# Patient Record
Sex: FEMALE | Race: WHITE | NOT HISPANIC OR LATINO | Employment: FULL TIME | URBAN - METROPOLITAN AREA
[De-identification: names, ages, dates, MRNs, and addresses within clinical notes are randomized per-mention and may not be internally consistent; named-entity substitution may affect disease eponyms.]

---

## 2017-10-16 ENCOUNTER — TRANSCRIBE ORDERS (OUTPATIENT)
Dept: ADMINISTRATIVE | Facility: HOSPITAL | Age: 52
End: 2017-10-16

## 2017-10-16 DIAGNOSIS — Z12.31 VISIT FOR SCREENING MAMMOGRAM: Primary | ICD-10-CM

## 2017-11-29 ENCOUNTER — GENERIC CONVERSION - ENCOUNTER (OUTPATIENT)
Dept: OTHER | Facility: OTHER | Age: 52
End: 2017-11-29

## 2017-11-29 ENCOUNTER — HOSPITAL ENCOUNTER (OUTPATIENT)
Dept: RADIOLOGY | Facility: HOSPITAL | Age: 52
Discharge: HOME | End: 2017-11-29
Payer: COMMERCIAL

## 2017-11-29 DIAGNOSIS — Z12.31 VISIT FOR SCREENING MAMMOGRAM: ICD-10-CM

## 2017-11-29 PROCEDURE — G0202 SCR MAMMO BI INCL CAD: HCPCS

## 2017-12-06 ENCOUNTER — HOSPITAL ENCOUNTER (OUTPATIENT)
Dept: ULTRASOUND IMAGING | Facility: CLINIC | Age: 52
Discharge: HOME | End: 2017-12-06
Payer: COMMERCIAL

## 2017-12-06 ENCOUNTER — GENERIC CONVERSION - ENCOUNTER (OUTPATIENT)
Dept: OTHER | Facility: OTHER | Age: 52
End: 2017-12-06

## 2017-12-06 DIAGNOSIS — R92.8 ABNORMAL MAMMOGRAM: ICD-10-CM

## 2017-12-06 PROCEDURE — 76642 ULTRASOUND BREAST LIMITED: CPT

## 2019-03-07 RX ORDER — PROPRANOLOL HYDROCHLORIDE 20 MG/1
TABLET ORAL
Qty: 60 TABLET | Refills: 1 | OUTPATIENT
Start: 2019-03-07

## 2019-04-22 DIAGNOSIS — R51.9 NONINTRACTABLE HEADACHE, UNSPECIFIED CHRONICITY PATTERN, UNSPECIFIED HEADACHE TYPE: Primary | ICD-10-CM

## 2019-04-22 RX ORDER — ZOLMITRIPTAN 5 MG/1
TABLET, FILM COATED ORAL
Qty: 30 TABLET | Refills: 0 | Status: SHIPPED | OUTPATIENT
Start: 2019-04-22 | End: 2020-04-14

## 2021-06-10 ENCOUNTER — OFFICE VISIT (OUTPATIENT)
Dept: OBGYN CLINIC | Facility: CLINIC | Age: 56
End: 2021-06-10
Payer: COMMERCIAL

## 2021-06-10 VITALS
SYSTOLIC BLOOD PRESSURE: 126 MMHG | HEIGHT: 60 IN | DIASTOLIC BLOOD PRESSURE: 82 MMHG | WEIGHT: 176.8 LBS | BODY MASS INDEX: 34.71 KG/M2

## 2021-06-10 DIAGNOSIS — Z12.31 VISIT FOR SCREENING MAMMOGRAM: ICD-10-CM

## 2021-06-10 DIAGNOSIS — Z72.3 INADEQUATE EXERCISE: ICD-10-CM

## 2021-06-10 DIAGNOSIS — E58 DIETARY CALCIUM DEFICIENCY: ICD-10-CM

## 2021-06-10 DIAGNOSIS — D21.9 FIBROIDS: ICD-10-CM

## 2021-06-10 DIAGNOSIS — Z01.419 ENCOUNTER FOR ANNUAL ROUTINE GYNECOLOGICAL EXAMINATION: Primary | ICD-10-CM

## 2021-06-10 PROBLEM — K64.8 OTHER HEMORRHOIDS: Status: ACTIVE | Noted: 2021-06-10

## 2021-06-10 PROCEDURE — S0610 ANNUAL GYNECOLOGICAL EXAMINA: HCPCS | Performed by: OBSTETRICS & GYNECOLOGY

## 2021-06-10 NOTE — PROGRESS NOTES
Pt is a60 y o   ,menopausal, who presents for preventive care  Partner same ; lifetime  1 partner         safe sexual practices followed: not currently active       does feel safe in relationship: yes    Dairy: 1 cheese daily  Vitamin D: takes supplement  Exercise: 2x/week  Pap: 3/13/2019-wnl, HRHPV neg, repeat next year  Mammogram: 2019-3D wnl, repeat rx given  Colonoscopy: 2021-wnl, repeat 10 years [facility])  DEXA: not indicated  safety at home: yes  tobacco use N  Pt reports h o large fibroids-denies any problems, but interested to know if they are still growing       Past Medical History:   Diagnosis Date    COVID-19 vaccine series completed     [place], completed 2nd dose in 2021    Fibroid uterus     Hyperlipemia     resolved    Varicella     Vitamin D deficiency        Past Surgical History:   Procedure Laterality Date    COLONOSCOPY      2021-wnl, repeat 10 years    WISDOM TOOTH EXTRACTION         Ob Hx:   OB History    Para Term  AB Living   2 2 2     2   SAB TAB Ectopic Multiple Live Births                  # Outcome Date GA Lbr Philip/2nd Weight Sex Delivery Anes PTL Lv   2 Term      Vag-Spont      1 Term      Vag-Spont         Obstetric Comments    x 2      Menarche: 12   Menopause: 47             Current Outpatient Medications:     Dextran 70-Hypromellose, PF, (Artificial Tears PF) 0 1-0 3 % SOLN, Apply 2 drops to eye 4 (four) times a day, Disp: 1 each, Rfl: 11    ergocalciferol (VITAMIN D2) 50,000 units, Take 1 capsule (50,000 Units total) by mouth once a week, Disp: 12 capsule, Rfl: 3    levothyroxine 75 mcg tablet, TAKE 1 TABLET (75 MCG TOTAL) BY MOUTH DAILY IN THE EARLY MORNING, Disp: 90 tablet, Rfl: 3    ZOLMitriptan (ZOMIG) 5 MG tablet, Take 1 tablet (5 mg total) by mouth once as needed for migraine for up to 1 dose, Disp: 8 tablet, Rfl: 6    No Known Allergies    Social History     Socioeconomic History    Marital status: /Civil Union     Spouse name: Juan Caal    Number of children: 2    Years of education: None    Highest education level: High school graduate   Occupational History    None   Social Needs    Financial resource strain: None    Food insecurity     Worry: None     Inability: None    Transportation needs     Medical: None     Non-medical: None   Tobacco Use    Smoking status: Never Smoker    Smokeless tobacco: Never Used   Substance and Sexual Activity    Alcohol use: Never     Frequency: Never    Drug use: Never    Sexual activity: Not Currently     Partners: Male     Birth control/protection: Post-menopausal     Comment: lifetime partners: 1   Lifestyle    Physical activity     Days per week: None     Minutes per session: None    Stress: None   Relationships    Social connections     Talks on phone: None     Gets together: None     Attends Zoroastrianism service: None     Active member of club or organization: None     Attends meetings of clubs or organizations: None     Relationship status: None    Intimate partner violence     Fear of current or ex partner: None     Emotionally abused: None     Physically abused: None     Forced sexual activity: None   Other Topics Concern    None   Social History Narrative    P[employer]ism: [employer]    [employer] blood products            Exercise: walking 2x/week    Calcium: 1 cheese daily       Family History   Problem Relation Age of Onset    Heart disease Mother     Asthma Mother     Diabetes type II Mother     Hypertension Mother     Other Father         smoker,  when patient was young, unsure why    Other Sister         hysterectomy    Heart disease Sister     Kidney cancer Brother 68        on top of kidney    Asthma Sister     Hypertension Sister     No Known Problems Brother     No Known Problems Brother     Diabetes Brother     Hypertension Brother     Heart disease Brother         required CABG    Diabetes Brother     Hypertension Brother     Hyperlipidemia Brother     Hypertension Brother     Diabetes Brother     Heart disease Brother         required stent    Other Brother         Twin of Radha    Breast cancer Neg Hx     Ovarian cancer Neg Hx     Colon cancer Neg Hx        Blood pressure 126/82, height 5' (1 524 m), weight 80 2 kg (176 lb 12 8 oz)  and Body mass index is 34 53 kg/m²  Physical Exam  Constitutional:       General: She is not in acute distress  Appearance: Normal appearance  She is well-developed  She is obese  She is not ill-appearing  HENT:      Head: Normocephalic and atraumatic  Eyes:      Extraocular Movements: Extraocular movements intact  Conjunctiva/sclera: Conjunctivae normal    Neck:      Musculoskeletal: Normal range of motion and neck supple  Thyroid: No thyromegaly  Trachea: No tracheal deviation  Cardiovascular:      Rate and Rhythm: Normal rate and regular rhythm  Heart sounds: Normal heart sounds  Pulmonary:      Effort: Pulmonary effort is normal  No respiratory distress  Breath sounds: Normal breath sounds  No stridor  No wheezing or rales  Abdominal:      General: Bowel sounds are normal  There is no distension  Palpations: Abdomen is soft  There is no mass  Tenderness: There is no abdominal tenderness  There is no guarding or rebound  Hernia: No hernia is present  Musculoskeletal: Normal range of motion  General: No tenderness  Lymphadenopathy:      Cervical: No cervical adenopathy  Skin:     General: Skin is warm  Findings: No erythema or rash  Neurological:      Mental Status: She is alert and oriented to person, place, and time  Psychiatric:         Mood and Affect: Mood normal          Behavior: Behavior normal          Thought Content:  Thought content normal          Judgment: Judgment normal           Breasts: breasts appear normal, no suspicious masses, no skin or nipple changes or axillary nodes, symmetric fibrous changes in both upper outer quadrants  vulva: normal external genitalia for age and no lesions, masses, epithelial changes, or exudate  vagina: color pale and rugae  absent rugae  cervix: parous and no lesions   uterus: NSSC, AF, NT, mobile and 14 wks  adnexa: no masses or tenderness    A/P:  Pt is a 63 y o   with       Davene Older was seen today for gynecologic exam     Diagnoses and all orders for this visit:    Encounter for annual routine gynecological examination  -pap up to date  -colonoscopy up to date    Visit for screening mammogram  -     Mammo diagnostic right w 3d & cad; Future    Fibroids  -     US pelvis complete w transvaginal; Future    Dietary calcium deficiency  Patient advised recommendation of daily dietary calcium of 1200 mg calcium  Inadequate exercise  Patient advised recommendation of exercise 5 times per week for 30 minutes  BMI 34 0-34 9,adult  Patient advised recommendation of BMI to be between 19-25

## 2021-06-14 ENCOUNTER — OFFICE VISIT (OUTPATIENT)
Dept: URGENT CARE | Age: 56
End: 2021-06-14
Payer: COMMERCIAL

## 2021-06-14 VITALS
TEMPERATURE: 98.1 F | WEIGHT: 175 LBS | HEIGHT: 62 IN | DIASTOLIC BLOOD PRESSURE: 89 MMHG | HEART RATE: 80 BPM | SYSTOLIC BLOOD PRESSURE: 136 MMHG | OXYGEN SATURATION: 96 % | BODY MASS INDEX: 32.2 KG/M2 | RESPIRATION RATE: 18 BRPM

## 2021-06-14 DIAGNOSIS — M54.50 ACUTE LEFT-SIDED LOW BACK PAIN WITHOUT SCIATICA: Primary | ICD-10-CM

## 2021-06-14 DIAGNOSIS — M77.11 LATERAL EPICONDYLITIS OF RIGHT ELBOW: ICD-10-CM

## 2021-06-14 PROCEDURE — 99213 OFFICE O/P EST LOW 20 MIN: CPT | Performed by: PHYSICIAN ASSISTANT

## 2021-06-14 PROCEDURE — 96372 THER/PROPH/DIAG INJ SC/IM: CPT | Performed by: PHYSICIAN ASSISTANT

## 2021-06-14 RX ORDER — KETOROLAC TROMETHAMINE 30 MG/ML
30 INJECTION, SOLUTION INTRAMUSCULAR; INTRAVENOUS ONCE
Status: COMPLETED | OUTPATIENT
Start: 2021-06-14 | End: 2021-06-14

## 2021-06-14 RX ADMIN — KETOROLAC TROMETHAMINE 30 MG: 30 INJECTION, SOLUTION INTRAMUSCULAR; INTRAVENOUS at 17:24

## 2021-06-14 NOTE — PATIENT INSTRUCTIONS
Acute Low Back Pain     Continue Motrin 800 mg every 8 hours as needed, hold until tomorrow morning as you received a Toradol shot in clinic today  Supplement with Tylenol as needed  Take muscle relaxer as prescribed, do not drive or operate machinery until you know how this medication affects you  Heat 20 minutes followed immediately by ice 20 minutes  May try over the counter lidocaine patches as needed, do not apply heat over patch  If symptoms are not improved in 3-4 days, follow-up with your primary care provider  If symptoms worsen, report to the emergency department  AMBULATORY CARE:   Acute low back pain  is sudden discomfort in your lower back area that lasts for up to 6 weeks  The discomfort makes it difficult to tolerate activity  Common symptoms include the following:   · Back stiffness or spasms    · Pain down the back or side of one leg    · Holding yourself in an unusual position or posture to decrease your back pain    · Not being able to find a sitting position that is comfortable    · Slow increase in your pain for 24 to 48 hours after you stress your back    · Tenderness on your lower back or severe pain when you move your back    Seek care immediately if:   · You have severe pain  · You have sudden stiffness and heaviness on both buttocks down to both legs  · You have numbness or weakness in one leg, or pain in both legs  · You have numbness in your genital area or across your lower back  · You cannot control your urine or bowel movements  Contact your healthcare provider if:   · You have a fever  · You have pain at night or when you rest     · Your pain does not get better with treatment  · You have pain that worsens when you cough or sneeze  · You suddenly feel something pop or snap in your back  · You have questions or concerns about your condition or care      The goal of treatment for acute low back pain  is to relieve your pain and help you tolerate activity  Most people with acute lower back pain get better within 4 to 6 weeks  You may need any of the following:  · NSAIDs  help decrease swelling and pain  This medicine is available with or without a doctor's order  NSAIDs can cause stomach bleeding or kidney problems in certain people  If you take blood thinner medicine, always ask your healthcare provider if NSAIDs are safe for you  Always read the medicine label and follow directions  · Acetaminophen  decreases pain and fever  It is available without a doctor's order  Ask how much to take and how often to take it  Follow directions  Read the labels of all other medicines you are using to see if they also contain acetaminophen, or ask your doctor or pharmacist  Acetaminophen can cause liver damage if not taken correctly  Do not use more than 4 grams (4,000 milligrams) total of acetaminophen in one day  · Muscle relaxers  decrease pain by relaxing the muscles in your lower spine  · Prescription pain medicine  may be given  Ask your healthcare provider how to take this medicine safely  Some prescription pain medicines contain acetaminophen  Do not take other medicines that contain acetaminophen without talking to your healthcare provider  Too much acetaminophen may cause liver damage  Prescription pain medicine may cause constipation  Ask your healthcare provider how to prevent or treat constipation  Manage your symptoms:   · Stay active  as much as you can without causing more pain  Bed rest could make your back pain worse  Start with some light exercises such as walking  Avoid heavy lifting until your pain is gone  Ask for more information about the activities or exercises that are right for you  · Apply ice  on your back for 15 to 20 minutes every hour or as directed  Use an ice pack, or put crushed ice in a plastic bag  Cover it with a towel before you apply it to your skin  Ice helps prevent tissue damage and decreases swelling and pain  · Apply heat  on your back for 20 to 30 minutes every 2 hours for as many days as directed  Heat helps decrease pain and muscle spasms  Alternate heat and ice  Prevent acute low back pain:   · Use proper body mechanics  ? Bend at the hips and knees when you  objects  Do not bend from the waist  Use your leg muscles as you lift the load  Do not use your back  Keep the object close to your chest as you lift it  Try not to twist or lift anything above your waist     ? Change your position often when you stand for long periods of time  Rest one foot on a small box or footrest, and then switch to the other foot often  ? Try not to sit for long periods of time  When you do, sit in a straight-backed chair with your feet flat on the floor  Never reach, pull, or push while you are sitting  · Do exercises that strengthen your back muscles  Warm up before you exercise  Ask your healthcare provider the best exercises for you  · Maintain a healthy weight  Ask your healthcare provider how much you should weigh  Ask him to help you create a weight loss plan if you are overweight  Follow up with your healthcare provider as directed:  Return for a follow-up visit if you still have pain after 1 to 3 weeks of treatment  You may need to visit an orthopedist if your back pain lasts longer than 12 weeks  Write down your questions so you remember to ask them during your visits  © Copyright [address] Information is for End User's use only and may not be sold, redistributed or otherwise used for commercial purposes  All illustrations and images included in CareNotes® are the copyrighted property of A D A M , Inc  or [facility] Ronal Johnson   The above information is an  only  It is not intended as medical advice for individual conditions or treatments  Talk to your doctor, nurse or pharmacist before following any medical regimen to see if it is safe and effective for you      Tennis Elbow Continue Motrin as needed  Supplement with Tylenol as needed  Ice 20 minutes on 20 minutes off as needed  Follow-up with with orthopedics for further treatment and evaluation, referral provided  If symptoms worsen, report to the emergency department  AMBULATORY CARE:   Tennis elbow  is inflammation of the tendons in your elbow  Tendons are strong tissues that connect muscle to bone  Common symptoms include the following:   · Pain on the side of your elbow that travels to your upper arm, forearm, or fingers    · Weakness in your wrist or hand    · Trouble holding, lifting, or grabbing an object, such as a coffee cup    · Red, swollen, warm skin on the outside of your elbow    Seek care immediately if:   · You suddenly have no feeling in your arm, hand, or fingers  · You suddenly cannot move your arm, wrist, hand, or fingers  Contact your healthcare provider if:   · Your symptoms do not get better within 2 weeks, even with treatment  · You have more pain or weakness in your arm, wrist, hand, or fingers  · You have new numbness or tingling in your arm, hand, or fingers  · You have questions or concerns about your condition or care  Treatment:   · Support devices  may be needed to limit your arm movement  Examples include an arm strap, brace, or splint  These devices also help decrease pain and prevent more damage to your tendon  · Acetaminophen  decreases pain and fever  It is available without a doctor's order  Ask how much to take and how often to take it  Follow directions  Read the labels of all other medicines you are using to see if they also contain acetaminophen, or ask your doctor or pharmacist  Acetaminophen can cause liver damage if not taken correctly  Do not use more than 4 grams (4,000 milligrams) total of acetaminophen in one day  · NSAIDs , such as ibuprofen, help decrease swelling, pain, and fever  This medicine is available with or without a doctor's order  NSAIDs can cause stomach bleeding or kidney problems in certain people  If you take blood thinner medicine, always ask your healthcare provider if NSAIDs are safe for you  Always read the medicine label and follow directions  · A steroid injection  will help decrease pain and swelling  · Physical therapy  may be recommended  A physical therapist teaches you exercises to help improve movement and strength, and to decrease pain  · Surgery  may be needed if your symptoms do not improve with other treatments  During surgery, your healthcare provider will remove any damaged tissue  He may also cut your tendon and reattach it  Self-care:   · Rest  your injured arm and avoid activities that cause pain  This will help your tendons heal     · Apply ice  on your elbow for 15 to 20 minutes every hour or as directed  Use an ice pack, or put crushed ice in a plastic bag  Cover it with a towel before you apply it to your skin  Ice helps prevent tissue damage and decreases swelling and pain  · Elevate  your elbow above the level of your heart as often as you can  This will help decrease swelling and pain  Prop your elbow on pillows or blankets to keep it elevated comfortably  Follow up with your healthcare provider as directed:  Write down your questions so you remember to ask them during your visits  © Copyright [address] [place] [address] Drive Information is for End User's use only and may not be sold, redistributed or otherwise used for commercial purposes  All illustrations and images included in CareNotes® are the copyrighted property of [employer]  or Stephie Plascencia  The above information is an  only  It is not intended as medical advice for individual conditions or treatments  Talk to your doctor, nurse or pharmacist before following any medical regimen to see if it is safe and effective for you

## 2021-06-14 NOTE — PROGRESS NOTES
[address]        NAME: Frankey Bryant is a 63 y o  female  : 1965    MRN: [id number]  DATE: 2021  TIME: 9:31 PM    Assessment and Plan   Acute left-sided low back pain without sciatica [M54 5]  1  Acute left-sided low back pain without sciatica  ketorolac (TORADOL) injection 30 mg   2  Lateral epicondylitis of right elbow  Ambulatory referral to Orthopedic Surgery   Pt with history and exam consistent with acute lumbar strain  Discussed Toradol shot and rx for Flexeril  Dosing and safety discussed  Recommend heat, ice, and lidocaine patches  Elbow symptoms and exam consistent with lateral epicondylitis  Recommend resume Ibuprofen in the morning  Compression with ACE wrap as needed  Orthopedic referral provided for definitive management  Patient Instructions   Acute Low Back Pain     Continue Motrin 800 mg every 8 hours as needed, hold until tomorrow morning as you received a Toradol shot in clinic today  Supplement with Tylenol as needed  Take muscle relaxer as prescribed, do not drive or operate machinery until you know how this medication affects you  Heat 20 minutes followed immediately by ice 20 minutes  May try over the counter lidocaine patches as needed, do not apply heat over patch  If symptoms are not improved in 3-4 days, follow-up with your primary care provider  If symptoms worsen, report to the emergency department  AMBULATORY CARE:   Acute low back pain  is sudden discomfort in your lower back area that lasts for up to 6 weeks  The discomfort makes it difficult to tolerate activity     Common symptoms include the following:   · Back stiffness or spasms    · Pain down the back or side of one leg    · Holding yourself in an unusual position or posture to decrease your back pain    · Not being able to find a sitting position that is comfortable    · Slow increase in your pain for 24 to 48 hours after you stress your back    · Tenderness on your lower back or severe pain when you move your back    Seek care immediately if:   · You have severe pain  · You have sudden stiffness and heaviness on both buttocks down to both legs  · You have numbness or weakness in one leg, or pain in both legs  · You have numbness in your genital area or across your lower back  · You cannot control your urine or bowel movements  Contact your healthcare provider if:   · You have a fever  · You have pain at night or when you rest     · Your pain does not get better with treatment  · You have pain that worsens when you cough or sneeze  · You suddenly feel something pop or snap in your back  · You have questions or concerns about your condition or care  The goal of treatment for acute low back pain  is to relieve your pain and help you tolerate activity  Most people with acute lower back pain get better within 4 to 6 weeks  You may need any of the following:  · NSAIDs  help decrease swelling and pain  This medicine is available with or without a doctor's order  NSAIDs can cause stomach bleeding or kidney problems in certain people  If you take blood thinner medicine, always ask your healthcare provider if NSAIDs are safe for you  Always read the medicine label and follow directions  · Acetaminophen  decreases pain and fever  It is available without a doctor's order  Ask how much to take and how often to take it  Follow directions  Read the labels of all other medicines you are using to see if they also contain acetaminophen, or ask your doctor or pharmacist  Acetaminophen can cause liver damage if not taken correctly  Do not use more than 4 grams (4,000 milligrams) total of acetaminophen in one day  · Muscle relaxers  decrease pain by relaxing the muscles in your lower spine  · Prescription pain medicine  may be given  Ask your healthcare provider how to take this medicine safely  Some prescription pain medicines contain acetaminophen   Do not take other medicines that contain acetaminophen without talking to your healthcare provider  Too much acetaminophen may cause liver damage  Prescription pain medicine may cause constipation  Ask your healthcare provider how to prevent or treat constipation  Manage your symptoms:   · Stay active  as much as you can without causing more pain  Bed rest could make your back pain worse  Start with some light exercises such as walking  Avoid heavy lifting until your pain is gone  Ask for more information about the activities or exercises that are right for you  · Apply ice  on your back for 15 to 20 minutes every hour or as directed  Use an ice pack, or put crushed ice in a plastic bag  Cover it with a towel before you apply it to your skin  Ice helps prevent tissue damage and decreases swelling and pain  · Apply heat  on your back for 20 to 30 minutes every 2 hours for as many days as directed  Heat helps decrease pain and muscle spasms  Alternate heat and ice  Prevent acute low back pain:   · Use proper body mechanics  ? Bend at the hips and knees when you  objects  Do not bend from the waist  Use your leg muscles as you lift the load  Do not use your back  Keep the object close to your chest as you lift it  Try not to twist or lift anything above your waist     ? Change your position often when you stand for long periods of time  Rest one foot on a small box or footrest, and then switch to the other foot often  ? Try not to sit for long periods of time  When you do, sit in a straight-backed chair with your feet flat on the floor  Never reach, pull, or push while you are sitting  · Do exercises that strengthen your back muscles  Warm up before you exercise  Ask your healthcare provider the best exercises for you  · Maintain a healthy weight  Ask your healthcare provider how much you should weigh  Ask him to help you create a weight loss plan if you are overweight      Follow up with your healthcare provider as directed:  Return for a follow-up visit if you still have pain after 1 to 3 weeks of treatment  You may need to visit an orthopedist if your back pain lasts longer than 12 weeks  Write down your questions so you remember to ask them during your visits  © Copyright [address] Information is for End User's use only and may not be sold, redistributed or otherwise used for commercial purposes  All illustrations and images included in CareNotes® are the copyrighted property of [employer]  or Stephie Johnson   The above information is an  only  It is not intended as medical advice for individual conditions or treatments  Talk to your doctor, nurse or pharmacist before following any medical regimen to see if it is safe and effective for you  Tennis Elbow     Continue Motrin as needed  Supplement with Tylenol as needed  Ice 20 minutes on 20 minutes off as needed  Follow-up with with orthopedics for further treatment and evaluation, referral provided  If symptoms worsen, report to the emergency department  AMBULATORY CARE:   Tennis elbow  is inflammation of the tendons in your elbow  Tendons are strong tissues that connect muscle to bone  Common symptoms include the following:   · Pain on the side of your elbow that travels to your upper arm, forearm, or fingers    · Weakness in your wrist or hand    · Trouble holding, lifting, or grabbing an object, such as a coffee cup    · Red, swollen, warm skin on the outside of your elbow    Seek care immediately if:   · You suddenly have no feeling in your arm, hand, or fingers  · You suddenly cannot move your arm, wrist, hand, or fingers  Contact your healthcare provider if:   · Your symptoms do not get better within 2 weeks, even with treatment  · You have more pain or weakness in your arm, wrist, hand, or fingers  · You have new numbness or tingling in your arm, hand, or fingers      · You have questions or concerns about your condition or care  Treatment:   · Support devices  may be needed to limit your arm movement  Examples include an arm strap, brace, or splint  These devices also help decrease pain and prevent more damage to your tendon  · Acetaminophen  decreases pain and fever  It is available without a doctor's order  Ask how much to take and how often to take it  Follow directions  Read the labels of all other medicines you are using to see if they also contain acetaminophen, or ask your doctor or pharmacist  Acetaminophen can cause liver damage if not taken correctly  Do not use more than 4 grams (4,000 milligrams) total of acetaminophen in one day  · NSAIDs , such as ibuprofen, help decrease swelling, pain, and fever  This medicine is available with or without a doctor's order  NSAIDs can cause stomach bleeding or kidney problems in certain people  If you take blood thinner medicine, always ask your healthcare provider if NSAIDs are safe for you  Always read the medicine label and follow directions  · A steroid injection  will help decrease pain and swelling  · Physical therapy  may be recommended  A physical therapist teaches you exercises to help improve movement and strength, and to decrease pain  · Surgery  may be needed if your symptoms do not improve with other treatments  During surgery, your healthcare provider will remove any damaged tissue  He may also cut your tendon and reattach it  Self-care:   · Rest  your injured arm and avoid activities that cause pain  This will help your tendons heal     · Apply ice  on your elbow for 15 to 20 minutes every hour or as directed  Use an ice pack, or put crushed ice in a plastic bag  Cover it with a towel before you apply it to your skin  Ice helps prevent tissue damage and decreases swelling and pain  · Elevate  your elbow above the level of your heart as often as you can  This will help decrease swelling and pain   Prop your elbow on pillows or blankets to keep it elevated comfortably  Follow up with your healthcare provider as directed:  Write down your questions so you remember to ask them during your visits  © Copyright [address] Information is for End User's use only and may not be sold, redistributed or otherwise used for commercial purposes  All illustrations and images included in CareNotes® are the copyrighted property of [employer]  or Stephie Johnson   The above information is an  only  It is not intended as medical advice for individual conditions or treatments  Talk to your doctor, nurse or pharmacist before following any medical regimen to see if it is safe and effective for you  Follow up with PCP in 3-5 days  Proceed to  ER if symptoms worsen  Chief Complaint     Chief Complaint   Patient presents with    Back Pain     pt reports low back pain after cleaning 5 days ago  Pt taking Ibuprofen without relief  Pain radiates down left leg     Elbow Pain     right elbow pain x 1 month  No known injury         History of Present Illness       63year old female presents with complaints of LBP x 5 days radiating into the left buttock  Pt reports she was cleaning and twisted to reach for something when symptoms started  Pain is worse with ROM of the back and flexion of the left hip  She denies radiation of pain into the BLE, paresthesias or BLE, baldder and bowel changes and saddle anesthesia  Pt has taken 800 mg Motrin every 6 hours with minimal relief  She states she had similar symptoms 4 years ago and had a shot which helped her pain  Pt additionally reports pain in the right elbow for 1 month duration, no inciting injury  She notes pain about the lateral epicondyle, no swelling noted  Pain is worse with overhead reaching  Pt notes some radiation into the forearm, but denies paresthesias of the fingers  No other concerns or complaints today         Review of Systems   Review of Systems   Constitutional: Negative for chills and fever  Musculoskeletal: Positive for arthralgias, back pain and gait problem  Neurological: Negative for weakness and numbness  Current Medications       Current Outpatient Medications:     Dextran 70-Hypromellose, PF, (Artificial Tears PF) 0 1-0 3 % SOLN, Apply 2 drops to eye 4 (four) times a day, Disp: 1 each, Rfl: 11    ergocalciferol (VITAMIN D2) 50,000 units, Take 1 capsule (50,000 Units total) by mouth once a week, Disp: 12 capsule, Rfl: 3    levothyroxine 75 mcg tablet, TAKE 1 TABLET (75 MCG TOTAL) BY MOUTH DAILY IN THE EARLY MORNING, Disp: 90 tablet, Rfl: 3    ZOLMitriptan (ZOMIG) 5 MG tablet, Take 1 tablet (5 mg total) by mouth once as needed for migraine for up to 1 dose, Disp: 8 tablet, Rfl: 6  No current facility-administered medications for this visit      Current Allergies     Allergies as of 2021    (No Known Allergies)            The following portions of the patient's history were reviewed and updated as appropriate: allergies, current medications, past family history, past medical history, past social history, past surgical history and problem list      Past Medical History:   Diagnosis Date    COVID-19 vaccine series completed     [place], completed 2nd dose in 2021    Fibroid uterus     Hyperlipemia     resolved    Varicella     Vitamin D deficiency        Past Surgical History:   Procedure Laterality Date    COLONOSCOPY      2021-wnl, repeat 10 years    WISDOM TOOTH EXTRACTION         Family History   Problem Relation Age of Onset    Heart disease Mother     Asthma Mother     Diabetes type II Mother     Hypertension Mother     Other Father         smoker,  when patient was young, unsure why    Other Sister         hysterectomy    Heart disease Sister     Kidney cancer Brother 68        on top of kidney    Asthma Sister     Hypertension Sister     No Known Problems Brother     No Known Problems Brother     Diabetes Brother     Hypertension Brother     Heart disease Brother         required CABG    Diabetes Brother     Hypertension Brother     Hyperlipidemia Brother     Hypertension Brother     Diabetes Brother     Heart disease Brother         required stent    Other Brother         Twin of Radha    Breast cancer Neg Hx     Ovarian cancer Neg Hx     Colon cancer Neg Hx          Medications have been verified  Objective   /89   Pulse 80   Temp 98 1 °F (36 7 °C)   Resp 18   Ht 5' 2" (1 575 m)   Wt 79 4 kg (175 lb)   LMP  (LMP Unknown)   SpO2 96%   BMI 32 01 kg/m²   No LMP recorded (lmp unknown)  Patient is postmenopausal        Physical Exam     Physical Exam  Vitals and nursing note reviewed  Constitutional:       General: She is awake  She is in acute distress (Pt in obvious pain and discomfort shifting between feet)  Appearance: Normal appearance  She is well-developed and well-groomed  She is not ill-appearing, toxic-appearing or diaphoretic  HENT:      Head: Normocephalic and atraumatic  Right Ear: Hearing and external ear normal       Left Ear: Hearing and external ear normal    Eyes:      General: Lids are normal  Vision grossly intact  Gaze aligned appropriately  Cardiovascular:      Rate and Rhythm: Normal rate  Pulmonary:      Effort: Pulmonary effort is normal       Comments: Patient is speaking in full sentences with no increased respiratory effort  No audible wheezing or stridor  Musculoskeletal:      Right elbow: Tenderness present in lateral epicondyle  No radial head, medial epicondyle or olecranon process tenderness  Left elbow: Normal       Cervical back: Normal range of motion  Lumbar back: Spasms and tenderness ( right) present  No swelling, edema, deformity, signs of trauma, lacerations or bony tenderness  Decreased range of motion  Positive left straight leg raise test  No scoliosis        Comments:  Patient with 5/5 strength bilateral lower extremities though hip flexion to the left causes pain  Sensation grossly intact dorsalis pedis and posterior tibialis pulses 2+ and brisk  Skin:     General: Skin is warm and dry  Neurological:      Mental Status: She is alert and oriented to person, place, and time  Motor: Motor function is intact  Coordination: Coordination is intact  Gait: Gait abnormal (pt with slow deliberate guarded gait  )  Psychiatric:         Attention and Perception: Attention and perception normal          Mood and Affect: Mood and affect normal          Speech: Speech normal          Behavior: Behavior normal  Behavior is cooperative

## 2021-10-04 PROBLEM — M35.01 SICCA SYNDROME WITH KERATOCONJUNCTIVITIS (HCC): Status: ACTIVE | Noted: 2021-10-04

## 2022-10-12 PROBLEM — M35.01 SICCA SYNDROME WITH KERATOCONJUNCTIVITIS (HCC): Status: RESOLVED | Noted: 2021-10-04 | Resolved: 2022-10-12

## 2023-08-07 ENCOUNTER — ANNUAL EXAM (OUTPATIENT)
Dept: OBGYN CLINIC | Facility: CLINIC | Age: 58
End: 2023-08-07
Payer: MEDICARE

## 2023-08-07 VITALS
HEIGHT: 62 IN | BODY MASS INDEX: 33.2 KG/M2 | WEIGHT: 180.4 LBS | DIASTOLIC BLOOD PRESSURE: 72 MMHG | SYSTOLIC BLOOD PRESSURE: 134 MMHG

## 2023-08-07 DIAGNOSIS — E58 DIETARY CALCIUM DEFICIENCY: ICD-10-CM

## 2023-08-07 DIAGNOSIS — Z12.31 VISIT FOR SCREENING MAMMOGRAM: ICD-10-CM

## 2023-08-07 DIAGNOSIS — G93.0 BRAIN CYST: ICD-10-CM

## 2023-08-07 DIAGNOSIS — Z72.3 INADEQUATE EXERCISE: ICD-10-CM

## 2023-08-07 DIAGNOSIS — Z12.4 PAP SMEAR FOR CERVICAL CANCER SCREENING: ICD-10-CM

## 2023-08-07 DIAGNOSIS — Z01.419 ENCOUNTER FOR ANNUAL ROUTINE GYNECOLOGICAL EXAMINATION: Primary | ICD-10-CM

## 2023-08-07 PROBLEM — R93.0 ABNORMAL MRI OF HEAD: Status: ACTIVE | Noted: 2023-07-27

## 2023-08-07 PROCEDURE — 99396 PREV VISIT EST AGE 40-64: CPT | Performed by: OBSTETRICS & GYNECOLOGY

## 2023-08-07 PROCEDURE — G0145 SCR C/V CYTO,THINLAYER,RESCR: HCPCS | Performed by: OBSTETRICS & GYNECOLOGY

## 2023-08-07 PROCEDURE — G0476 HPV COMBO ASSAY CA SCREEN: HCPCS | Performed by: OBSTETRICS & GYNECOLOGY

## 2023-08-07 NOTE — PROGRESS NOTES
Pt is a60 y.o. [id number] ,menopausal, who presents for preventive care  Partner same ; lifetime  1 partner         safe sexual practices followed: not currently active     does feel safe in relationship:yes    Dairy: 1 cheese daily, 1 c almond milk 5x/week, 1 yogurt once weekly  Vitamin D: none  Exercise: walking 2-4x/week  Pap: 3/13/2019-wnl, HRHPV neg, repeat collected today  Mammogram: 2023-wnl, repeat for 1 year given  Colonoscopy: 2021-wnl, repeat 10 years  DEXA: not indicated  safety at home:  yes  tobacco use N.      Past Medical History:   Diagnosis Date   • COVID-19 vaccine series completed     nc, completed 2nd dose in 2021   • Fibroid uterus    • Hyperlipemia     resolved   • Pap smear for cervical cancer screening     no abnormal; 3/2019-wnl, HRHPV neg; 2023   • Varicella    • Vitamin D deficiency        Past Surgical History:   Procedure Laterality Date   • COLONOSCOPY      2021-wnl, repeat 10 years   • WISDOM TOOTH EXTRACTION         Ob Hx:   OB History    Para Term  AB Living   2 2 2     2   SAB IAB Ectopic Multiple Live Births           2      # Outcome Date GA Lbr Philip/2nd Weight Sex Delivery Anes PTL Lv   2 Term     M Vag-Spont   CHACHO   1 Term     M Vag-Spont   CHACHO      Obstetric Comments    x 2      Menarche: 12   Menopause: 52           Current Outpatient Medications:   •  hydrocortisone (ANUSOL-HC) 2.5 % rectal cream, Apply topically 2 (two) times a day, Disp: 60 g, Rfl: 3  •  levothyroxine 75 mcg tablet, TAKE ONE TABLET BY MOUTH EVERY MORNING, Disp: 90 tablet, Rfl: 1  •  propranolol (INDERAL) 20 mg tablet, Take 1 tablet (20 mg total) by mouth every 12 (twelve) hours, Disp: 180 tablet, Rfl: 3  •  rosuvastatin (CRESTOR) 10 MG tablet, Take 1 tablet (10 mg total) by mouth daily, Disp: 90 tablet, Rfl: 3  •  ZOLMitriptan (ZOMIG) 5 MG tablet, TAKE ONE TABLET BY MOUTH ONCE AS NEEDED FOR MIGRAINE FOR UP TO 1 DOSE, Disp: 8 tablet, Rfl: 6    No Known Allergies    Social History     Socioeconomic History   • Marital status: /Civil Union     Spouse name: Silas Pompa   • Number of children: 2   • Years of education: None   • Highest education level: High school graduate   Occupational History   • None   Tobacco Use   • Smoking status: Never   • Smokeless tobacco: Never   Vaping Use   • Vaping Use: Never used   Substance and Sexual Activity   • Alcohol use: Never   • Drug use: Never   • Sexual activity: Not Currently     Partners: Male     Birth control/protection: Post-menopausal     Comment: lifetime partners: 1   Other Topics Concern   • None   Social History Narrative    Denominational: [employer]    [employer] blood products            Exercise: walking 2-4x/week    Calcium: 1 cheese daily, 1 c almond milk 5x/week, 1 yogurt once weekly     Social Determinants of Health     Financial Resource Strain: Not on file   Food Insecurity: Not on file   Transportation Needs: Not on file   Physical Activity: Not on file   Stress: Not on file   Social Connections: Not on file   Intimate Partner Violence: Not on file   Housing Stability: Not on file       Family History   Problem Relation Age of Onset   • Heart disease Mother    • Asthma Mother    • Diabetes type II Mother    • Hypertension Mother    • Other Father         smoker,  when patient was young, unsure why   • Other Sister         hysterectomy   • Heart disease Sister    • Asthma Sister    • Hypertension Sister    • Kidney cancer Brother 68        on top of kidney   • No Known Problems Brother    • Colon cancer Brother 76   • Diabetes Brother    • Hypertension Brother    • Other Brother         liver infection   • Heart disease Brother         required CABG   • Diabetes Brother    • Hypertension Brother    • Hyperlipidemia Brother    • Hypertension Brother    • Diabetes Brother    • Heart disease Brother         required stent   • Other Brother         Twin of Radha   • Breast cancer Neg Hx    • Ovarian cancer Neg Hx Blood pressure 134/72, height 5' 2" (1.575 m), weight 81.8 kg (180 lb 6.4 oz). and Body mass index is 33 kg/m². Physical Exam  Constitutional:       General: She is not in acute distress. Appearance: Normal appearance. She is well-developed. She is obese. She is not ill-appearing. HENT:      Head: Normocephalic and atraumatic. Eyes:      Extraocular Movements: Extraocular movements intact. Conjunctiva/sclera: Conjunctivae normal.   Neck:      Thyroid: No thyromegaly. Trachea: No tracheal deviation. Cardiovascular:      Rate and Rhythm: Normal rate and regular rhythm. Heart sounds: Normal heart sounds. Pulmonary:      Effort: Pulmonary effort is normal. No respiratory distress. Breath sounds: Normal breath sounds. No stridor. No wheezing or rales. Abdominal:      General: Bowel sounds are normal. There is no distension. Palpations: Abdomen is soft. There is no mass. Tenderness: There is no abdominal tenderness. There is no guarding or rebound. Hernia: No hernia is present. Musculoskeletal:         General: No tenderness. Normal range of motion. Cervical back: Normal range of motion and neck supple. Lymphadenopathy:      Cervical: No cervical adenopathy. Skin:     General: Skin is warm. Findings: No erythema or rash. Neurological:      Mental Status: She is alert and oriented to person, place, and time. Psychiatric:         Mood and Affect: Mood normal.         Behavior: Behavior normal.         Thought Content: Thought content normal.         Judgment: Judgment normal.          Breasts: breasts appear normal, no suspicious masses, no skin or nipple changes or axillary nodes, symmetric fibrous changes in both upper outer quadrants.       vulva: normal external genitalia for age and no lesions, masses, epithelial changes, or exudate  vagina: color pale and rugae  absent rugae  cervix: parous, no lesions  and pap smear  uterus: NSSC, AF, NT, mobile and 14 wks  adnexa: no masses or tenderness  rectum: no masses or nodularity    A/P:  Pt is a 61 y.o. [id number] with       Anahi Arreguin was seen today for gynecologic exam.    Diagnoses and all orders for this visit:    Encounter for annual routine gynecological examination  -stable examination  -pap up dated today  -colonoscopy up to date    Visit for screening mammogram  -     Mammo screening bilateral w 3d & cad; Future    Pap smear for cervical cancer screening  -     Liquid-based pap, screening    Dietary calcium deficiency  Patient advised recommendation of daily dietary calcium of 1200 mg calcium. Inadequate exercise  Patient advised recommendation of exercise 5 times per week for 30 minutes. BMI 33.0-33.9,adult  Patient advised recommendation of BMI to be between 19-25.

## 2023-08-09 LAB
HPV HR 12 DNA CVX QL NAA+PROBE: NEGATIVE
HPV16 DNA CVX QL NAA+PROBE: NEGATIVE
HPV18 DNA CVX QL NAA+PROBE: NEGATIVE

## 2023-08-15 LAB
LAB AP GYN PRIMARY INTERPRETATION: NORMAL
Lab: NORMAL

## 2023-11-22 ENCOUNTER — HOSPITAL ENCOUNTER (OUTPATIENT)
Dept: MRI IMAGING | Facility: HOSPITAL | Age: 58
Discharge: HOME | End: 2023-11-22
Payer: MEDICARE

## 2023-11-22 DIAGNOSIS — G93.9 BRAIN LESION: ICD-10-CM

## 2023-11-22 PROCEDURE — A9585 GADOBUTROL INJECTION: HCPCS | Performed by: FAMILY MEDICINE

## 2023-11-22 PROCEDURE — G1004 CDSM NDSC: HCPCS

## 2023-11-22 PROCEDURE — 70553 MRI BRAIN STEM W/O & W/DYE: CPT

## 2023-11-22 RX ORDER — GADOBUTROL 604.72 MG/ML
8 INJECTION INTRAVENOUS
Status: COMPLETED | OUTPATIENT
Start: 2023-11-22 | End: 2023-11-22

## 2023-11-22 RX ADMIN — GADOBUTROL 8 ML: 604.72 INJECTION INTRAVENOUS at 11:14

## 2023-11-22 NOTE — LETTER
[address] [place] [address]  [address] [address]      November 30, 2023    MRN: [id number]     Phone: [phone]     Dear Ms. Young Cole recently had a(n) MRI performed on 11/22/2023 at  [address] [place] [address] that was requested by Carlita Watkins MD. The study was reviewed by a radiologist, which is a physician who specializes in medical imaging. The radiologist issued a report describing his or her findings. In that report there was a finding that the radiologist felt warranted further discussion with your health care provider and that discussion would be beneficial to you. The results were sent to Carlita Watkins MD on 11/28/2023  3:09 PM. We recommend that you contact Carlita Watkins MD at [phone] or set up an appointment to discuss the results of the imaging test. If you have already heard from Carlita Watkins MD regarding the results of your study, you can disregard this letter. This letter is not meant to alarm you, but intended to encourage you to follow-up on your results with the provider that sent you for the imaging study. In addition, we have enclosed answers to frequently asked questions by other patients who have also received a letter to review results with their health care provider (see page two). Thank you for choosing [address] [place] [address] for your medical imaging needs. FREQUENTLY ASKED QUESTIONS    Why am I receiving this letter? [facility] requires us to notify patients who have findings on imaging exams that may require more testing or follow-up with a health professional within the next 3 months. How serious is the finding on the imaging test?  This letter is sent to all patients who may need follow-up or more testing within the next 3 months.   Receiving this letter does not necessarily mean you have a life-threatening imaging finding or disease. Recommendations in the radiologist’s imaging report are general in nature and it is up to your healthcare provider to say whether those recommendations make sense for your situation. You are strongly encouraged to talk to your health care provider about the results and ask whether additional steps need to be taken. Where can I get a copy of the final report for my recent radiology exam?  To get a full copy of the report you can access your records online at [email or web address] or please contact [facility] Medical Records Department at [phone] Monday through Friday between 8 am and 6 pm.         What do I need to do now? Please contact your health care provider who requested the imaging study to discuss what further actions (if any) are needed. You may have already heard from (your ordering provider) in regard to this test in which case you can disregard this letter. NOTICE IN ACCORDANCE WITH THE [place] “PATIENT TEST RESULT INFORMATION ACT OF 2018”    You are receiving this notice as a result of a determination by your diagnostic imaging service that further discussions of your test results are warranted and would be beneficial to you. The complete results of your test or tests have been or will be sent to the health care practitioner that ordered the test or tests. It is recommended that you contact your health care practitioner to discuss your results as soon as possible.

## 2023-12-01 ENCOUNTER — HOSPITAL ENCOUNTER (OUTPATIENT)
Dept: NON INVASIVE DIAGNOSTICS | Facility: HOSPITAL | Age: 58
Discharge: HOME | End: 2023-12-01
Payer: MEDICARE

## 2023-12-01 VITALS — BODY MASS INDEX: 32.76 KG/M2 | HEIGHT: 62 IN | WEIGHT: 178 LBS

## 2023-12-01 DIAGNOSIS — R07.9 CHEST PAIN, UNSPECIFIED TYPE: ICD-10-CM

## 2023-12-01 LAB
MAX HR PERCENT: 85 %
MAX HR PERCENT: 88 %
MAX HR: 142 BPM
MAX HR: 142 BPM
RATE PRESSURE PRODUCT: NORMAL
SL CV STRESS RECOVERY BP: NORMAL MMHG
SL CV STRESS RECOVERY HR: 87 BPM
SL CV STRESS RECOVERY O2 SAT: 98 %
SL CV STRESS STAGE REACHED: 2
STRESS ANGINA INDEX: 0
STRESS BASELINE BP: NORMAL MMHG
STRESS BASELINE BP: NORMAL MMHG
STRESS BASELINE HR: 85 BPM
STRESS BASELINE HR: 85 BPM
STRESS O2 SAT REST: 96 %
STRESS PEAK HR: 142 BPM
STRESS POST ESTIMATED WORKLOAD: 7 METS
STRESS POST ESTIMATED WORKLOAD: 7 METS
STRESS POST EXERCISE DUR MIN: 6 MIN
STRESS POST O2 SAT PEAK: 99 %
STRESS POST PEAK BP: 190 MMHG
STRESS POST PEAK HR: 142 BPM
STRESS POST PEAK SYSTOLIC BP: 190 MMHG

## 2023-12-01 PROCEDURE — 93018 CV STRESS TEST I&R ONLY: CPT | Performed by: STUDENT IN AN ORGANIZED HEALTH CARE EDUCATION/TRAINING PROGRAM

## 2023-12-01 PROCEDURE — 93017 CV STRESS TEST TRACING ONLY: CPT

## 2023-12-01 PROCEDURE — 93016 CV STRESS TEST SUPVJ ONLY: CPT | Performed by: STUDENT IN AN ORGANIZED HEALTH CARE EDUCATION/TRAINING PROGRAM

## 2023-12-27 ENCOUNTER — OFFICE VISIT (OUTPATIENT)
Dept: CARDIOLOGY CLINIC | Facility: CLINIC | Age: 58
End: 2023-12-27
Payer: MEDICARE

## 2023-12-27 VITALS
SYSTOLIC BLOOD PRESSURE: 132 MMHG | HEART RATE: 67 BPM | WEIGHT: 174 LBS | DIASTOLIC BLOOD PRESSURE: 74 MMHG | HEIGHT: 62 IN | BODY MASS INDEX: 32.02 KG/M2

## 2023-12-27 DIAGNOSIS — E78.5 HYPERLIPIDEMIA: ICD-10-CM

## 2023-12-27 DIAGNOSIS — R06.02 SOB (SHORTNESS OF BREATH): Primary | ICD-10-CM

## 2023-12-27 DIAGNOSIS — R00.2 PALPITATIONS: ICD-10-CM

## 2023-12-27 PROCEDURE — 99204 OFFICE O/P NEW MOD 45 MIN: CPT | Performed by: INTERNAL MEDICINE

## 2023-12-27 PROCEDURE — 93000 ELECTROCARDIOGRAM COMPLETE: CPT | Performed by: INTERNAL MEDICINE

## 2023-12-27 RX ORDER — ASPIRIN 81 MG/1
81 TABLET, CHEWABLE ORAL DAILY
Qty: 30 TABLET | Refills: 2 | Status: SHIPPED | OUTPATIENT
Start: 2023-12-27 | End: 2024-03-26

## 2023-12-27 RX ORDER — ASPIRIN 81 MG/1
81 TABLET, CHEWABLE ORAL DAILY
Qty: 30 TABLET | Refills: 0 | Status: SHIPPED | OUTPATIENT
Start: 2023-12-27 | End: 2023-12-27

## 2023-12-27 NOTE — PROGRESS NOTES
"Outpatient Consultation - General Cardiology   Radha Santos 58 y.o. female   MRN: [id number]  Encounter: [id number]          History of Present Illness     Physician Requesting Consult: Consults   PCP: Deb Escobedo MD      Reason for Consult / Principal Problem: Shortness of breath      I had the pleasure of seeing Radha Santos who presents to establish care with [facility] for evaluation of shortness of breath.     Patient is here today with her daughter, who helped translate our discussion.  Patient is here today for evaluation of shortness of breath and question about recent exercise stress test.  Patient states that she has been been feeling short of breath over the last few months.  Patient states that her shortness of breath usually happens after 1 to 2 hours of doing housework.  Patient is able to comfortably go up and down stairs without stopping.  Patient states her shortness of breath improves once she rests.  Patient denies any associated chest pain/chest pressure, nausea, diaphoresis, palpitations or lightheadedness.    Patient has a past medical history of migraine headaches, for which she is on a triptan, hypothyroidism.  She also has past medical history of hyperlipidemia.    In terms of her family history, patient states that both her mother and father had heart attacks in their 70s.  Her brother also had a heart attack requiring \"open heart surgery\".     In terms of social history patient is not an every day smoker.  In the past she used to smoke hookah 1-2 times every few months.  However, she has completely quit and does not partake in this anymore.      Given her symptoms and initiation of triptans, her PCP had sent her for exercise stress test.  She had fair exercise capacity reaching 7 METS and achieving 88% of MPHR.  The EKG showed mild T wave inversions in inferior leads.    EKG today shows normal sinus rhythm with Q waves noted in leads II, III, aVF and V4 - V6.      Diagnoses and " all orders for this visit:    SOB (shortness of breath)  -     NM myocardial perfusion spect (stress and/or rest); Future    Palpitations  -     POCT ECG    Hyperlipidemia  -     Discontinue: aspirin 81 mg chewable tablet; Chew 1 tablet (81 mg total) daily  -     aspirin 81 mg chewable tablet; Chew 1 tablet (81 mg total) daily            Patient Active Problem List   Diagnosis    Backache    Hypothyroidism    Migraine without aura    Pain in joints    Fibroid uterus    Other hemorrhoids    Encounter for annual routine gynecological examination    Visit for screening mammogram    Fibroids    Abnormal MRI of head    Dietary calcium deficiency    Inadequate exercise    BMI 33.0-33.9,adult     Past Medical History:   Diagnosis Date    COVID-19 vaccine series completed     Pfizer, completed 2nd dose in March 2021    Fibroid uterus     Hyperlipemia     resolved    Pap smear for cervical cancer screening     no abnormal; 3/2019-wnl, HRHPV neg; 8/2023    Varicella     Vitamin D deficiency      Social History     Socioeconomic History    Marital status: /Civil Union     Spouse name: Gabino Yancey    Number of children: 2    Years of education: Not on file    Highest education level: High school graduate   Occupational History    Not on file   Tobacco Use    Smoking status: Never    Smokeless tobacco: Never   Vaping Use    Vaping status: Never Used   Substance and Sexual Activity    Alcohol use: Never    Drug use: Never    Sexual activity: Not Currently     Partners: Male     Birth control/protection: Post-menopausal     Comment: lifetime partners: 1   Other Topics Concern    Not on file   Social History Narrative    [employer]: [employer]    Accepts blood products            Exercise: walking 2-4x/week    Calcium: 1 cheese daily, 1 c almond milk 5x/week, 1 yogurt once weekly     Social Determinants of Health     Financial Resource Strain: Not on file   Food Insecurity: Not on file   Transportation Needs: Not on  file   Physical Activity: Not on file   Stress: Not on file   Social Connections: Not on file   Intimate Partner Violence: Not on file   Housing Stability: Not on file      Family History   Problem Relation Age of Onset    Heart disease Mother     Asthma Mother     Diabetes type II Mother     Hypertension Mother     Other Father         smoker,  when patient was young, unsure why    Other Sister         hysterectomy    Heart disease Sister     Asthma Sister     Hypertension Sister     Kidney cancer Brother 73        on top of kidney    No Known Problems Brother     Colon cancer Brother 74    Diabetes Brother     Hypertension Brother     Other Brother         liver infection    Heart disease Brother         required CABG    Diabetes Brother     Hypertension Brother     Hyperlipidemia Brother     Hypertension Brother     Diabetes Brother     Heart disease Brother         required stent    Other Brother         Twin of Radha    Breast cancer Neg Hx     Ovarian cancer Neg Hx      Past Surgical History:   Procedure Laterality Date    COLONOSCOPY      2021-wnl, repeat 10 years    WISDOM TOOTH EXTRACTION         Current Outpatient Medications:     aspirin 81 mg chewable tablet, Chew 1 tablet (81 mg total) daily, Disp: 30 tablet, Rfl: 2    benzonatate (TESSALON PERLES) 100 mg capsule, Take 1 capsule (100 mg total) by mouth 3 (three) times a day as needed for cough, Disp: 20 capsule, Rfl: 0    hydrocortisone (ANUSOL-HC) 2.5 % rectal cream, Apply topically 2 (two) times a day, Disp: 60 g, Rfl: 3    levothyroxine 75 mcg tablet, Take 1 tablet (75 mcg total) by mouth daily, Disp: 90 tablet, Rfl: 1    propranolol (INDERAL) 20 mg tablet, Take 1 tablet (20 mg total) by mouth every 12 (twelve) hours, Disp: 180 tablet, Rfl: 3    rosuvastatin (CRESTOR) 10 MG tablet, Take 1 tablet (10 mg total) by mouth daily, Disp: 90 tablet, Rfl: 3    ZOLMitriptan (ZOMIG) 5 MG tablet, TAKE ONE TABLET BY MOUTH ONCE AS NEEDED FOR MIGRAINE FOR UP  "TO 1 DOSE, Disp: 8 tablet, Rfl: 6  No Known Allergies      Review of Systems  Review of system was conducted and was negative except for as stated in the HPI.        Objective   Vitals: Blood pressure 132/74, pulse 67, height 5' 2\" (1.575 m), weight 78.9 kg (174 lb).      EKG:   Date: 12/27/2023  Interpretation: Normal sinus rhythm, cannot rule out inferior infarct      Physical Exam    GEN: Radha Santos appears well, alert and oriented x 3, pleasant and cooperative   HEENT:  Normocephalic, atraumatic, anicteric, moist mucous membranes  NECK: No JVD or carotid bruits   HEART: Regular rhythm, regular rate, normal S1 and S2, no murmurs, clicks, gallops or rubs   LUNGS: Clear to auscultation bilaterally; no wheezes, rales, or rhonchi; respiration nonlabored   ABDOMEN:  Normoactive bowel sounds, soft, no tenderness, no distention  EXTREMITIES: peripheral pulses palpable; no edema  NEURO: no gross focal findings; cranial nerves grossly intact   SKIN:  Dry, intact, warm to touch    Lab Results: I have personally reviewed pertinent lab results.        Imaging: I have personally reviewed pertinent reports.          Studies reviewed by me:       Previous STRESS TEST: Patient had exercise stress test on 12/1/23 which was none diagnostic due baseline T wave changes.      ECHO: Patient's echocardiogram from 2018 shows normal EF of 60% with no wall motion abnormalities no significant valvular abnormalities noted.          Assessment & Plan      Shortness of breath.  Patient did not have reproduction of shortness of breath symptoms during her exercise stress test.  States that her shortness of breath usually occurs 1 to 2 hours of doing activity.  Based on patient's history it sounds like she has good to excellent exercise tolerance.  Her shortness of breath does not appear to be an anginal equivalent, it is more likely that this is deconditioning.  However given significant family history and abnormal EKG, further imaging would " be reasonable.    Hyperlipidemia.  Patient's ASCVD score is between 7 to 10%.  Patient is on Crestor 10 mg daily currently.  LDL has dropped from 176-147 over the last 2 years    Migraine headaches.  Patient is currently on propranolol      Plan:   Will start patient on ASA 81 mg daily for primary prevention    Will obtain exercise nuclear stress test given extensive family history and Q waves noted on EKG    Patient can continue on Crestor 10 mg daily we will aim for goal LDL less than 100, and total cholesterol of 150    Encourage lifestyle modification        Owen Thomas MD  Cardiology Fellow   PGY-4      ==========================================================================================    Epic/ Allscripts/Care Everywhere records reviewed: Yes    ** Please Note: Fluency DirectDictation voice to text software may have been used in the creation of this document. **

## 2024-02-21 PROBLEM — Z01.419 ENCOUNTER FOR ANNUAL ROUTINE GYNECOLOGICAL EXAMINATION: Status: RESOLVED | Noted: 2021-06-10 | Resolved: 2024-02-21

## 2024-06-10 ENCOUNTER — HOSPITAL ENCOUNTER (OUTPATIENT)
Dept: NON INVASIVE DIAGNOSTICS | Facility: CLINIC | Age: 59
Discharge: HOME | End: 2024-06-10
Payer: MEDICARE

## 2024-06-10 VITALS — HEART RATE: 66 BPM | DIASTOLIC BLOOD PRESSURE: 90 MMHG | OXYGEN SATURATION: 98 % | SYSTOLIC BLOOD PRESSURE: 140 MMHG

## 2024-06-10 DIAGNOSIS — R06.02 SOB (SHORTNESS OF BREATH): ICD-10-CM

## 2024-06-10 LAB
CHEST PAIN STATEMENT: NORMAL
MAX DIASTOLIC BP: 100 MMHG
MAX HR PERCENT: 96 %
MAX HR: 150 BPM
MAX PREDICTED HEART RATE: 161 BPM
NUC STRESS EJECTION FRACTION: 74 %
PROTOCOL NAME: NORMAL
RATE PRESSURE PRODUCT: NORMAL
REASON FOR TERMINATION: NORMAL
SL CV REST NUCLEAR ISOTOPE DOSE: 10.11 MCI
SL CV STRESS NUCLEAR ISOTOPE DOSE: 31.2 MCI
SL CV STRESS RECOVERY BP: NORMAL MMHG
SL CV STRESS RECOVERY HR: 80 BPM
SL CV STRESS RECOVERY O2 SAT: 98 %
SL CV STRESS STAGE REACHED: 3
STRESS ANGINA INDEX: 0
STRESS BASELINE BP: NORMAL MMHG
STRESS BASELINE HR: 66 BPM
STRESS O2 SAT REST: 98 %
STRESS PEAK HR: 150 BPM
STRESS POST ESTIMATED WORKLOAD: 10.1 METS
STRESS POST EXERCISE DUR MIN: 7 MIN
STRESS POST EXERCISE DUR MIN: 7 MIN
STRESS POST EXERCISE DUR SEC: 45 SEC
STRESS POST EXERCISE DUR SEC: 45 SEC
STRESS POST O2 SAT PEAK: 98 %
STRESS POST PEAK BP: 182 MMHG
STRESS POST PEAK HR: 150 BPM
STRESS POST PEAK SYSTOLIC BP: 182 MMHG
STRESS/REST PERFUSION RATIO: 1.02
TARGET HR FORMULA: NORMAL
TEST INDICATION: NORMAL

## 2024-06-10 PROCEDURE — 93016 CV STRESS TEST SUPVJ ONLY: CPT | Performed by: INTERNAL MEDICINE

## 2024-06-10 PROCEDURE — 93017 CV STRESS TEST TRACING ONLY: CPT

## 2024-06-10 PROCEDURE — 78452 HT MUSCLE IMAGE SPECT MULT: CPT | Performed by: INTERNAL MEDICINE

## 2024-06-10 PROCEDURE — G1004 CDSM NDSC: HCPCS

## 2024-06-10 PROCEDURE — A9502 TC99M TETROFOSMIN: HCPCS

## 2024-06-10 PROCEDURE — 78452 HT MUSCLE IMAGE SPECT MULT: CPT

## 2024-06-10 PROCEDURE — 93018 CV STRESS TEST I&R ONLY: CPT | Performed by: INTERNAL MEDICINE

## 2024-07-18 ENCOUNTER — VBI (OUTPATIENT)
Dept: ADMINISTRATIVE | Facility: OTHER | Age: 59
End: 2024-07-18

## 2024-07-18 PROBLEM — I83.811 VARICOSE VEINS OF RIGHT LOWER EXTREMITY WITH PAIN: Status: ACTIVE | Noted: 2024-07-18

## 2025-01-18 DIAGNOSIS — E78.5 HYPERLIPIDEMIA: ICD-10-CM

## 2025-01-20 RX ORDER — ASPIRIN 81 MG/1
81 TABLET, CHEWABLE ORAL DAILY
Qty: 30 TABLET | Refills: 0 | Status: SHIPPED | OUTPATIENT
Start: 2025-01-20

## 2025-03-22 DIAGNOSIS — E78.5 HYPERLIPIDEMIA: ICD-10-CM

## 2025-03-22 RX ORDER — ASPIRIN 81 MG/1
81 TABLET, CHEWABLE ORAL DAILY
Qty: 90 TABLET | Refills: 1 | Status: SHIPPED | OUTPATIENT
Start: 2025-03-22